# Patient Record
Sex: FEMALE | Race: ASIAN | NOT HISPANIC OR LATINO
[De-identification: names, ages, dates, MRNs, and addresses within clinical notes are randomized per-mention and may not be internally consistent; named-entity substitution may affect disease eponyms.]

---

## 2022-02-03 PROBLEM — Z00.00 ENCOUNTER FOR PREVENTIVE HEALTH EXAMINATION: Status: ACTIVE | Noted: 2022-02-03

## 2022-02-04 ENCOUNTER — NON-APPOINTMENT (OUTPATIENT)
Age: 72
End: 2022-02-04

## 2022-02-04 ENCOUNTER — APPOINTMENT (OUTPATIENT)
Dept: CARDIOLOGY | Facility: CLINIC | Age: 72
End: 2022-02-04
Payer: MEDICAID

## 2022-02-04 VITALS
OXYGEN SATURATION: 96 % | HEART RATE: 102 BPM | SYSTOLIC BLOOD PRESSURE: 146 MMHG | TEMPERATURE: 98.2 F | BODY MASS INDEX: 54.63 KG/M2 | WEIGHT: 271 LBS | DIASTOLIC BLOOD PRESSURE: 84 MMHG | HEIGHT: 59 IN | RESPIRATION RATE: 20 BRPM

## 2022-02-04 DIAGNOSIS — R00.2 PALPITATIONS: ICD-10-CM

## 2022-02-04 PROCEDURE — 93306 TTE W/DOPPLER COMPLETE: CPT

## 2022-02-04 PROCEDURE — 93000 ELECTROCARDIOGRAM COMPLETE: CPT

## 2022-02-04 PROCEDURE — 99204 OFFICE O/P NEW MOD 45 MIN: CPT

## 2022-02-07 PROBLEM — R00.2 PALPITATIONS: Status: ACTIVE | Noted: 2022-02-07

## 2022-02-07 RX ORDER — ACETAMINOPHEN 500 MG/1
500 TABLET ORAL
Qty: 90 | Refills: 0 | Status: ACTIVE | COMMUNITY
Start: 2022-01-14

## 2022-02-07 RX ORDER — LISINOPRIL AND HYDROCHLOROTHIAZIDE TABLETS 20; 25 MG/1; MG/1
20-25 TABLET ORAL
Qty: 30 | Refills: 0 | Status: ACTIVE | COMMUNITY
Start: 2022-01-14

## 2022-02-07 NOTE — HISTORY OF PRESENT ILLNESS
[FreeTextEntry1] : 71 year-old female with HTN, hypothyroidism, presents for evaluation of SOB.  Patient reports that for the past few years she has been experiencing SOB with exertion.  Patient denies CP.  Patient reports palpitations.  Patient denies h/o syncope.  Patient reports snoring.  She is on Lisinopril HCTZ 20-25 mg for HTN.  I advised patient to undergo an echocardiogram.  I advised patient to wear a Holter monitor.  I advised patient to undergo a CTA of coronary arteries.  I will obtain insurance authorization ([employer], unable to walk on treadmill).

## 2022-02-07 NOTE — PHYSICAL EXAM
[Well Developed] : well developed [Well Nourished] : well nourished [No Acute Distress] : no acute distress [Normal Conjunctiva] : normal conjunctiva [Normal Venous Pressure] : normal venous pressure [No Carotid Bruit] : no carotid bruit [Normal S1, S2] : normal S1, S2 [No Murmur] : no murmur [No Rub] : no rub [No Gallop] : no gallop [Clear Lung Fields] : clear lung fields [Good Air Entry] : good air entry [No Respiratory Distress] : no respiratory distress  [Soft] : abdomen soft [Non Tender] : non-tender [No Masses/organomegaly] : no masses/organomegaly [Normal Bowel Sounds] : normal bowel sounds [No Edema] : no edema [No Cyanosis] : no cyanosis [No Clubbing] : no clubbing [No Varicosities] : no varicosities [Moves all extremities] : moves all extremities [No Focal Deficits] : no focal deficits [Normal Speech] : normal speech [Alert and Oriented] : alert and oriented [Normal memory] : normal memory [de-identified] : limited mobility.

## 2022-02-07 NOTE — PHYSICAL EXAM
[Well Developed] : well developed [Well Nourished] : well nourished [No Acute Distress] : no acute distress [Normal Conjunctiva] : normal conjunctiva [Normal Venous Pressure] : normal venous pressure [No Carotid Bruit] : no carotid bruit [Normal S1, S2] : normal S1, S2 [No Murmur] : no murmur [No Rub] : no rub [No Gallop] : no gallop [Clear Lung Fields] : clear lung fields [Good Air Entry] : good air entry [No Respiratory Distress] : no respiratory distress  [Soft] : abdomen soft [Non Tender] : non-tender [No Masses/organomegaly] : no masses/organomegaly [Normal Bowel Sounds] : normal bowel sounds [No Edema] : no edema [No Cyanosis] : no cyanosis [No Clubbing] : no clubbing [No Varicosities] : no varicosities [Moves all extremities] : moves all extremities [No Focal Deficits] : no focal deficits [Normal Speech] : normal speech [Alert and Oriented] : alert and oriented [Normal memory] : normal memory [de-identified] : limited mobility.

## 2022-02-17 LAB
DEPRECATED D DIMER PPP IA-ACNC: 295 NG/ML DDU
NT-PROBNP SERPL-MCNC: 87 PG/ML

## 2022-03-07 ENCOUNTER — APPOINTMENT (OUTPATIENT)
Dept: ORTHOPEDIC SURGERY | Facility: CLINIC | Age: 72
End: 2022-03-07
Payer: MEDICAID

## 2022-03-07 VITALS
WEIGHT: 271 LBS | BODY MASS INDEX: 54.63 KG/M2 | SYSTOLIC BLOOD PRESSURE: 146 MMHG | HEIGHT: 59 IN | DIASTOLIC BLOOD PRESSURE: 84 MMHG | HEART RATE: 102 BPM

## 2022-03-07 DIAGNOSIS — M17.0 BILATERAL PRIMARY OSTEOARTHRITIS OF KNEE: ICD-10-CM

## 2022-03-07 DIAGNOSIS — M54.16 RADICULOPATHY, LUMBAR REGION: ICD-10-CM

## 2022-03-07 DIAGNOSIS — G89.29 LUMBAGO WITH SCIATICA, LEFT SIDE: ICD-10-CM

## 2022-03-07 DIAGNOSIS — E66.01 MORBID (SEVERE) OBESITY DUE TO EXCESS CALORIES: ICD-10-CM

## 2022-03-07 DIAGNOSIS — Z87.448 PERSONAL HISTORY OF OTHER DISEASES OF URINARY SYSTEM: ICD-10-CM

## 2022-03-07 DIAGNOSIS — R06.02 SHORTNESS OF BREATH: ICD-10-CM

## 2022-03-07 DIAGNOSIS — M47.816 SPONDYLOSIS W/OUT MYELOPATHY OR RADICULOPATHY, LUMBAR REGION: ICD-10-CM

## 2022-03-07 DIAGNOSIS — M54.42 LUMBAGO WITH SCIATICA, LEFT SIDE: ICD-10-CM

## 2022-03-07 DIAGNOSIS — M54.41 LUMBAGO WITH SCIATICA, LEFT SIDE: ICD-10-CM

## 2022-03-07 PROCEDURE — 99204 OFFICE O/P NEW MOD 45 MIN: CPT

## 2022-03-07 NOTE — REASON FOR VISIT
[Initial Visit] : an initial visit for [Degenerative Joint Disease] : degenerative joint disease [Back Pain] : back pain [Scoliosis] : scoliosis [FreeTextEntry2] : Bilateral knee pain

## 2022-03-07 NOTE — HISTORY OF PRESENT ILLNESS
[de-identified] : This soon to be 73-year-old woman is seen in the office today along with her son who serves as an .  According to history she has a 15-year history of constant lower back pain.  It seems the pain may radiate to her legs but mostly she is complaining of bilateral knee pain worse on the left than on the right.  She has not had associated neurologic symptoms of numbness or paresthesias.  The back pain is worse with coughing, sneezing and forcing to move her bowels and she has had night pain.  She has no pain sitting.  She has both back and bilateral knee pain with standing and walking.  She has been treated in [place] and in [place].  Apparently at one point she was on Naprosyn for perhaps 3 weeks once or twice a day.  She had thyroid surgery in the past for benign disease.  She had a recent medical work-up.  It revealed an elevated creatinine of 1.26 in the normal and that lab is no higher than 1.02.  Her coronary arteries showed only mild occlusions of 25% on cardiac testing.

## 2022-03-07 NOTE — DISCUSSION/SUMMARY
[de-identified] : I discussed with the patient's son that normally the symptoms would initially be treated with nonsteroidal anti-inflammatory medications but the limited blood work we have from late January shows that she has an elevated creatinine and I am reluctant to start her on anti-inflammatory medication in light of that.  Heat may help her symptoms.  She can push Tylenol to the full dose.\par \par I discussed that I do not treat knee arthritis symptoms but due to her BMI she would not be a candidate for any surgical treatment at this point.  I discussed that if they saw one of our knee specialist she may benefit from some injections in her knees.  I stressed the necessity for weight reduction.\par \par Her creatinine should probably be repeated again in 6 weeks and if it has improved we could consider starting her on nonsteroidal anti-inflammatory medication.

## 2022-03-07 NOTE — PHYSICAL EXAM
[de-identified] : Due to the language barrier it is difficult to assess her orientation and mood.  She is morbidly obese at 4 foot 11 weight 271 pounds with a BMI.  She has difficulty even moving around in a chair today.  She has deformity of both knees which are swollen. [de-identified] : I reviewed outside x-rays of the lumbar spine that reveal a moderate scoliosis with multilevel degenerative changes.  There is no evidence of fracture and there are no destructive changes.\par \par I reviewed x-rays of both knees that reveal deformities with severe tricompartmental disease.  They reportedly have had an MRI of the lumbar spine but that is not available for review.

## 2023-01-16 NOTE — REVIEW OF SYSTEMS
[Arthralgia] : arthralgia [Joint Pain] : joint pain [Joint Stiffness] : joint stiffness [Joint Swelling] : joint swelling [SOB on Exertion] : shortness of breath on exertion [Negative] : ENT [facility] Counseling:  I discussed with the patient the risks of imiquimod including but not limited to erythema, scaling, itching, weeping, crusting, and pain.  Patient understands that the inflammatory response to imiquimod is variable from person to person and was educated regarded proper titration schedule.  If flu-like symptoms develop, patient knows to discontinue the medication and contact us.